# Patient Record
Sex: MALE | Race: WHITE | NOT HISPANIC OR LATINO | Employment: OTHER | URBAN - METROPOLITAN AREA
[De-identification: names, ages, dates, MRNs, and addresses within clinical notes are randomized per-mention and may not be internally consistent; named-entity substitution may affect disease eponyms.]

---

## 2018-03-14 ENCOUNTER — HOSPITAL ENCOUNTER (OUTPATIENT)
Dept: OTHER | Facility: HOSPITAL | Age: 68
Setting detail: SPECIMEN
Discharge: HOME | End: 2018-03-14
Attending: PSYCHIATRY & NEUROLOGY | Admitting: PSYCHIATRY & NEUROLOGY

## 2018-03-14 LAB
ALBUMIN SERPL-MCNC: 4.3 G/DL (ref 3.5–4.8)
ALBUMIN/GLOB SERPL: 1.5 {RATIO} (ref 1–1.7)
ALP SERPL-CCNC: 60 IU/L (ref 32–91)
ALT SERPL-CCNC: 51 IU/L (ref 17–63)
ANION GAP SERPL CALC-SCNC: 7.3 MMOL/L (ref 10–20)
AST SERPL-CCNC: 37 IU/L (ref 15–41)
BASOPHILS # BLD AUTO: 0 10*3/UL (ref 0–0.2)
BASOPHILS NFR BLD AUTO: 0 % (ref 0–2)
BILIRUB SERPL-MCNC: 0.8 MG/DL (ref 0.3–1.2)
BUN SERPL-MCNC: 8 MG/DL (ref 8–20)
BUN/CREAT SERPL: 6.7 (ref 6.2–20.3)
CALCIUM SERPL-MCNC: 9.9 MG/DL (ref 8.9–10.3)
CHLORIDE SERPL-SCNC: 104 MMOL/L (ref 101–111)
CONV CO2: 30 MMOL/L (ref 22–32)
CONV TOTAL PROTEIN: 7.1 G/DL (ref 6.1–7.9)
CREAT UR-MCNC: 1.2 MG/DL (ref 0.7–1.2)
DIFFERENTIAL METHOD BLD: (no result)
EOSINOPHIL # BLD AUTO: 0.1 10*3/UL (ref 0–0.3)
EOSINOPHIL # BLD AUTO: 2 % (ref 0–3)
ERYTHROCYTE [DISTWIDTH] IN BLOOD BY AUTOMATED COUNT: 13.1 % (ref 11.5–14.5)
ERYTHROCYTE [SEDIMENTATION RATE] IN BLOOD BY WESTERGREN METHOD: 10 MM/HR (ref 0–20)
GLOBULIN UR ELPH-MCNC: 2.8 G/DL (ref 2.5–3.8)
GLUCOSE SERPL-MCNC: 72 MG/DL (ref 65–99)
HCT VFR BLD AUTO: 48.5 % (ref 40–54)
HGB BLD-MCNC: 16.7 G/DL (ref 14–18)
LYMPHOCYTES # BLD AUTO: 2 10*3/UL (ref 0.8–4.8)
LYMPHOCYTES NFR BLD AUTO: 41 % (ref 18–42)
MCH RBC QN AUTO: 31.4 PG (ref 26–32)
MCHC RBC AUTO-ENTMCNC: 34.5 G/DL (ref 32–36)
MCV RBC AUTO: 91.1 FL (ref 80–94)
MONOCYTES # BLD AUTO: 0.6 10*3/UL (ref 0.1–1.3)
MONOCYTES NFR BLD AUTO: 13 % (ref 2–11)
NEUTROPHILS # BLD AUTO: 2.1 10*3/UL (ref 2.3–8.6)
NEUTROPHILS NFR BLD AUTO: 44 % (ref 50–75)
NRBC BLD AUTO-RTO: 0 /100{WBCS}
NRBC/RBC NFR BLD MANUAL: 0 10*3/UL
PLATELET # BLD AUTO: 248 10*3/UL (ref 150–450)
PMV BLD AUTO: 9 FL (ref 7.4–10.4)
POTASSIUM SERPL-SCNC: 4.3 MMOL/L (ref 3.6–5.1)
RBC # BLD AUTO: 5.32 10*6/UL (ref 4.6–6)
SODIUM SERPL-SCNC: 137 MMOL/L (ref 136–144)
WBC # BLD AUTO: 4.9 10*3/UL (ref 4.5–11.5)

## 2018-03-16 LAB
ANA SER QL IA: NORMAL
CHROMATIN AB SERPL-ACNC: <20 [IU]/ML (ref 0–20)

## 2018-03-19 ENCOUNTER — HOSPITAL ENCOUNTER (OUTPATIENT)
Dept: OTHER | Facility: HOSPITAL | Age: 68
Setting detail: SPECIMEN
Discharge: HOME | End: 2018-03-19
Attending: PSYCHIATRY & NEUROLOGY | Admitting: PSYCHIATRY & NEUROLOGY

## 2018-03-19 LAB
Lab: 24
PROT 24H UR-MRATE: 98 MG/(24.H) (ref 0–150)
PROT UR-MCNC: 4 MG/DL
URINE VOLUME: 2450 ML

## 2018-03-20 LAB
ARSENIC 24H UR-MRATE: NOT DETECTED MCG/SPEC (ref 0–35)
CADMIUM 24H UR-MRATE: 1.1 MCG/SPEC (ref 0–1.3)
COLLECT DURATION TIME UR: 24 H
LEAD 24H UR-MRATE: NOT DETECTED MCG/SPEC (ref 0–4)
MERCURY 24H UR-MRATE: NOT DETECTED MCG/SPEC (ref 0–9)
URINE VOLUME: 2450 ML

## 2018-03-21 LAB
INSULIN SERPL-ACNC: NORMAL U[IU]/ML
URINE PROT ELECTRO: NORMAL

## 2018-03-22 LAB — INTERPRETATION UR IFE-IMP: NORMAL

## 2019-12-10 ENCOUNTER — OFFICE VISIT (OUTPATIENT)
Dept: NEUROLOGY | Facility: CLINIC | Age: 69
End: 2019-12-10

## 2019-12-10 VITALS
HEIGHT: 71 IN | HEART RATE: 71 BPM | BODY MASS INDEX: 26.91 KG/M2 | WEIGHT: 192.2 LBS | SYSTOLIC BLOOD PRESSURE: 116 MMHG | DIASTOLIC BLOOD PRESSURE: 73 MMHG

## 2019-12-10 DIAGNOSIS — R20.2 NUMBNESS AND TINGLING SENSATION OF SKIN: ICD-10-CM

## 2019-12-10 DIAGNOSIS — R20.0 NUMBNESS AND TINGLING SENSATION OF SKIN: ICD-10-CM

## 2019-12-10 DIAGNOSIS — G62.89 PERIPHERAL AXONAL NEUROPATHY: Primary | ICD-10-CM

## 2019-12-10 PROBLEM — I10 HYPERTENSION: Status: ACTIVE | Noted: 2018-03-14

## 2019-12-10 PROCEDURE — 99214 OFFICE O/P EST MOD 30 MIN: CPT | Performed by: PSYCHIATRY & NEUROLOGY

## 2019-12-10 RX ORDER — GABAPENTIN 600 MG/1
TABLET ORAL
Qty: 360 TABLET | Refills: 3 | Status: SHIPPED | OUTPATIENT
Start: 2019-12-10 | End: 2020-12-10 | Stop reason: SDUPTHER

## 2019-12-10 RX ORDER — LISINOPRIL 20 MG/1
TABLET ORAL
COMMUNITY
Start: 2019-10-22

## 2019-12-10 RX ORDER — GABAPENTIN 600 MG/1
TABLET ORAL
COMMUNITY
Start: 2019-10-27 | End: 2019-12-10

## 2020-01-08 ENCOUNTER — TELEPHONE (OUTPATIENT)
Dept: NEUROLOGY | Facility: CLINIC | Age: 70
End: 2020-01-08

## 2020-01-08 NOTE — TELEPHONE ENCOUNTER
Patient was seen in December and you mentioned a anti depressant that would help him with the pain. Patient is seeing his PCP today and would like to discuss that with him I looked in his chart and didn't see any recommendation maybe you can recall what you told him. Please advise.

## 2020-12-10 ENCOUNTER — OFFICE VISIT (OUTPATIENT)
Dept: NEUROLOGY | Facility: CLINIC | Age: 70
End: 2020-12-10

## 2020-12-10 VITALS
HEIGHT: 70 IN | DIASTOLIC BLOOD PRESSURE: 80 MMHG | SYSTOLIC BLOOD PRESSURE: 129 MMHG | HEART RATE: 65 BPM | WEIGHT: 196.4 LBS | BODY MASS INDEX: 28.12 KG/M2 | TEMPERATURE: 97.1 F

## 2020-12-10 DIAGNOSIS — G62.89 PERIPHERAL AXONAL NEUROPATHY: Primary | ICD-10-CM

## 2020-12-10 PROBLEM — D69.6 THROMBOCYTOPENIA (HCC): Status: ACTIVE | Noted: 2019-09-09

## 2020-12-10 PROCEDURE — 99212 OFFICE O/P EST SF 10 MIN: CPT | Performed by: PSYCHIATRY & NEUROLOGY

## 2020-12-10 RX ORDER — GABAPENTIN 600 MG/1
TABLET ORAL
Qty: 450 TABLET | Refills: 3 | Status: SHIPPED | OUTPATIENT
Start: 2020-12-10 | End: 2020-12-23 | Stop reason: SDUPTHER

## 2020-12-10 RX ORDER — INFLUENZA A VIRUS A/MICHIGAN/45/2015 X-275 (H1N1) ANTIGEN (FORMALDEHYDE INACTIVATED), INFLUENZA A VIRUS A/SINGAPORE/INFIMH-16-0019/2016 IVR-186 (H3N2) ANTIGEN (FORMALDEHYDE INACTIVATED), INFLUENZA B VIRUS B/PHUKET/3073/2013 ANTIGEN (FORMALDEHYDE INACTIVATED), AND INFLUENZA B VIRUS B/MARYLAND/15/2016 BX-69A ANTIGEN (FORMALDEHYDE INACTIVATED) 60; 60; 60; 60 UG/.7ML; UG/.7ML; UG/.7ML; UG/.7ML
INJECTION, SUSPENSION INTRAMUSCULAR
COMMUNITY
Start: 2020-09-22 | End: 2021-01-01

## 2020-12-10 NOTE — PROGRESS NOTES
Subjective: Peripheral axonal neuropathy, Numbness and tingling sensation of skin    Patient ID: Sher Hinds is a 70 y.o. male.    History of Present Illness, yearly f/u    Peripheral axonal neuropathy, treated with Gabapentin     Numbness and tingling sensation of skin, stable     Patient states about the same has lost the feeling due to his C-6 issues has a hard time doing detailed work.     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 Was hospitalized.      Patient states outside allot and its nothing for him to go in the house from having frequent falls.     Essential tremor, no medication needed at this time.     The following portions of the patient's history were reviewed and updated as appropriate: allergies, current medications, past family history, past medical history, past social history, past surgical history and problem list.    Family History   Problem Relation Age of Onset   • Stroke Mother    • Dementia Mother    • Melanoma Father        Past Medical History:   Diagnosis Date   • Back pain    • Depression    • Diverticulitis    • H/O multiple concussions    • Hyperlipidemia    • Hypertension    • Peripheral neuropathy        Social History     Socioeconomic History   • Marital status:      Spouse name: Not on file   • Number of children: Not on file   • Years of education: Not on file   • Highest education level: Not on file   Tobacco Use   • Smoking status: Former Smoker   • Smokeless tobacco: Never Used   Substance and Sexual Activity   • Alcohol use: Yes     Comment: socially    • Drug use: Never   • Sexual activity: Defer         Current Outpatient Medications:   •  Acetaminophen (TYLENOL) 325 MG capsule, TYLENOL 325 MG CAPS, Disp: , Rfl:   •  Fluzone High-Dose Quadrivalent 0.7 ML suspension prefilled syringe, , Disp: , Rfl:   •  gabapentin (NEURONTIN) 600 MG tablet, One po qid, Disp: 360 tablet, Rfl: 3  •  lisinopril  (PRINIVIL,ZESTRIL) 20 MG tablet, , Disp: , Rfl:     Review of Systems   Constitutional: Negative for appetite change and fatigue.   HENT: Negative for sinus pressure and sinus pain.    Eyes: Negative for pain and itching.   Respiratory: Negative for cough and shortness of breath.    Cardiovascular: Negative for chest pain and palpitations.   Gastrointestinal: Negative for constipation and diarrhea.   Endocrine: Negative for cold intolerance and heat intolerance.   Genitourinary: Negative for difficulty urinating and frequency.   Musculoskeletal: Positive for gait problem. Negative for neck pain.   Allergic/Immunologic: Negative for environmental allergies.   Neurological: Positive for weakness and numbness. Negative for dizziness, tremors, seizures, syncope, facial asymmetry, speech difficulty, light-headedness and headaches.   Psychiatric/Behavioral: Positive for agitation. Negative for confusion.          I have reviewed ROS completed by medical assistant.     Objective:    Neurologic Exam     Mental Status   Oriented to person, place, and time.   Attention: normal.   Level of consciousness: alert    Gait, Coordination, and Reflexes     Reflexes   Right patellar: 1+  Left patellar: 1+  Right achilles: 0  Left achilles: 0      Physical Exam  Vitals signs reviewed.   Neurological:      Mental Status: He is oriented to person, place, and time.      Deep Tendon Reflexes:      Reflex Scores:       Patellar reflexes are 1+ on the right side and 1+ on the left side.       Achilles reflexes are 0 on the right side and 0 on the left side.  Psychiatric:         Mood and Affect: Mood normal.         Behavior: Behavior normal.         Thought Content: Thought content normal.         Assessment/Plan:    Diagnoses and all orders for this visit:    1. Peripheral axonal neuropathy (Primary)      Continue gabapentin. 4 to 5 600 mg per day      This document has been electronically signed by Joseph Seipel, MD on December 10, 2020  09:29 EST

## 2020-12-23 ENCOUNTER — TELEPHONE (OUTPATIENT)
Dept: NEUROLOGY | Facility: CLINIC | Age: 70
End: 2020-12-23

## 2020-12-23 NOTE — TELEPHONE ENCOUNTER
Pharmacy does have the rx cannot fill until the 30th can you re write the rx for 5 time a day last not says 4-5 qd and the rx is for 4 qd he is probably running out early thank you

## 2020-12-23 NOTE — TELEPHONE ENCOUNTER
PT CALLED IN TODAY TO SAY THAT [facility] STILL HASN'T RECEIVED HIS RX FOR GABAPENTIN THAT WAS SENT IN BY DR. SEIPEL ON 12/10/20. I NOTIFIED HIM THAT IT HAD BEEN SENT AND HE STATED UNDERSTANDING AND WILL CONTACT [facility]. I ADVISED HIM THAT I WOULD SEND OVER A MESSAGE LETTING THE PROVIDER KNOW THAT THE PHARMACY IS STATING IT HASN'T BEEN RECEIVED.        CALL BACK- [phone]

## 2020-12-24 RX ORDER — GABAPENTIN 600 MG/1
TABLET ORAL
Qty: 450 TABLET | Refills: 3 | Status: SHIPPED | OUTPATIENT
Start: 2020-12-24 | End: 2022-01-01

## 2021-01-01 ENCOUNTER — OFFICE VISIT (OUTPATIENT)
Dept: NEUROLOGY | Facility: CLINIC | Age: 71
End: 2021-01-01

## 2021-01-01 VITALS
SYSTOLIC BLOOD PRESSURE: 120 MMHG | TEMPERATURE: 97.7 F | WEIGHT: 196 LBS | DIASTOLIC BLOOD PRESSURE: 68 MMHG | HEART RATE: 70 BPM | HEIGHT: 70 IN | BODY MASS INDEX: 28.06 KG/M2

## 2021-01-01 DIAGNOSIS — R20.0 NUMBNESS AND TINGLING SENSATION OF SKIN: ICD-10-CM

## 2021-01-01 DIAGNOSIS — G25.0 BENIGN ESSENTIAL TREMOR: ICD-10-CM

## 2021-01-01 DIAGNOSIS — R20.2 NUMBNESS AND TINGLING SENSATION OF SKIN: ICD-10-CM

## 2021-01-01 DIAGNOSIS — G62.89 PERIPHERAL AXONAL NEUROPATHY: Primary | ICD-10-CM

## 2021-01-01 PROCEDURE — 99214 OFFICE O/P EST MOD 30 MIN: CPT | Performed by: PSYCHIATRY & NEUROLOGY

## 2021-01-01 RX ORDER — ESCITALOPRAM OXALATE 10 MG/1
TABLET ORAL
COMMUNITY
Start: 2021-01-01

## 2021-01-01 RX ORDER — PRIMIDONE 50 MG/1
TABLET ORAL
Qty: 120 TABLET | Refills: 2 | Status: SHIPPED | OUTPATIENT
Start: 2021-01-01

## 2021-03-10 ENCOUNTER — TELEPHONE (OUTPATIENT)
Dept: NEUROLOGY | Facility: CLINIC | Age: 71
End: 2021-03-10

## 2021-03-10 NOTE — TELEPHONE ENCOUNTER
I have no recommendation about shoes he would have to get this from his primary care physician or his podiatrist

## 2021-03-10 NOTE — TELEPHONE ENCOUNTER
Provider: DR SEIPEL    Caller: DONAL CASTRO    Relationship to Patient: SELF    Phone Number: [phone]    Reason for Call: THE PT CALLED IN TODAY BECAUSE HE STATED HE HAS NON-DIABETIC NEUROPATHY AND HE WAS LOOKING INTO GETTING DIABETIC NEUROPATHY SHOES AND HE IS WANTING TO GET DR SEIPEL'S RECOMMENDATION ON WHAT TYPE OF SHOE HE SHOULD GET. PLEASE GIVE HIM A CALL BACK WHEN POSSIBLE TO DISCUSS THIS.

## 2021-05-11 ENCOUNTER — ON CAMPUS - OUTPATIENT (OUTPATIENT)
Dept: URBAN - METROPOLITAN AREA HOSPITAL 77 | Facility: HOSPITAL | Age: 71
End: 2021-05-11
Payer: MEDICARE

## 2021-05-11 DIAGNOSIS — Z86.010 PERSONAL HISTORY OF COLONIC POLYPS: ICD-10-CM

## 2021-05-11 DIAGNOSIS — D12.5 BENIGN NEOPLASM OF SIGMOID COLON: ICD-10-CM

## 2021-05-11 DIAGNOSIS — K57.30 DIVERTICULOSIS OF LARGE INTESTINE WITHOUT PERFORATION OR ABS: ICD-10-CM

## 2021-05-11 DIAGNOSIS — K55.21 ANGIODYSPLASIA OF COLON WITH HEMORRHAGE: ICD-10-CM

## 2021-05-11 PROCEDURE — 45385 COLONOSCOPY W/LESION REMOVAL: CPT | Mod: 59,PT | Performed by: INTERNAL MEDICINE

## 2021-05-11 PROCEDURE — 45388 COLONOSCOPY W/ABLATION: CPT | Mod: PT | Performed by: INTERNAL MEDICINE

## 2021-05-11 PROCEDURE — 45385 COLONOSCOPY W/LESION REMOVAL: CPT | Mod: PT,59 | Performed by: INTERNAL MEDICINE

## 2021-12-09 NOTE — PROGRESS NOTES
Chief Complaint  Tremors, Peripheral Neuropathy, and Balance Issues    Subjective          Sher Hinds presents to [facility] for tremors,balance and neuropathy  History of Present Illness  Tremors- bilateral hand, rt hand worst than left.  Reports the tremor is most bothersome when he is trying to do close work.  He does note the tremor by eating and other activities.  He has no resting tremor.    Neuropathy- bilateral foot, patient states is worst since last visit he states he feels like feet are on fire w/ tingling/ stabbing pain. Patient and spouse states he couldn't wear shoes Friday he get to the point he cant stand for anything to touch area of neuropathy he states Gabapentin make problem manageable.    Balance- patient states his balance is better last ov he was having a bad spell of vertigo he went to PT to help          ====previous ov 12/10/20 dr seipel=====  yearly f/u     Peripheral axonal neuropathy, treated with Gabapentin     Numbness and tingling sensation of skin, stable      Patient states about the same has lost the feeling due to his C-6 issues has a hard time doing detailed work.      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 Was hospitalized.       Patient states outside allot and its nothing for him to go in the house from having frequent falls.      Essential tremor, no medication needed at this time    ============Dr Aguilar, 2018==================  History of Present Illness:          This is a left-handed patient who presents with follow-up visit.  The patient complains of Neuro`General Visit.  Problems reviewed and addressed today include: after testing.  Since the last visit patient notes no recent ER visit.  The patient is   having lab work done.  The pt states he continues to experience pain in his BLE/feet which interferes with sleep.  This pain has been present since 2006, but is  "not associated with any increased stumbling or falling.  The pt gets agitated when the pain is   more severe.  The pt denies any new motor weakness, speech or visual disturbances.     EMG LUE/LLE (3/22/2018) reveals a sensorimotor axonal peripheral neuropathy.     Lab results (3/14/2018) reveals unremarkable CBC, CMP, T4, TSH, ESR, ASAF, RA, B12.  (3/19/2018) reveals normal 24 hr urine heavy metal screen and UPE.  ====================================================    Current Outpatient Medications:   •  Acetaminophen (TYLENOL) 325 MG capsule, TYLENOL 325 MG CAPS, Disp: , Rfl:   •  escitalopram (LEXAPRO) 10 MG tablet, , Disp: , Rfl:   •  gabapentin (NEURONTIN) 600 MG tablet, One po 5 times per day, Disp: 450 tablet, Rfl: 3  •  lisinopril (PRINIVIL,ZESTRIL) 20 MG tablet, , Disp: , Rfl:   •  primidone (MYSOLINE) 50 MG tablet, One at hs May increase by one tab every two weeks to max of 5 tabs at hs, Disp: 120 tablet, Rfl: 2    Review of Systems   Constitutional: Positive for activity change and appetite change.   HENT: Positive for tinnitus.    Endocrine: Positive for heat intolerance. Negative for cold intolerance.   Musculoskeletal: Negative for joint swelling.   Neurological: Positive for tremors and numbness.   Psychiatric/Behavioral: Negative for agitation and confusion.          Objective:    Vital Signs:   /68   Pulse 70   Temp 97.7 °F (36.5 °C) (Temporal)   Ht 177.8 cm (70\")   Wt 88.9 kg (196 lb)   BMI 28.12 kg/m²     Physical Exam  Vitals reviewed.   Constitutional:       Appearance: Normal appearance.   Neurological:      Mental Status: He is alert and oriented to person, place, and time.      Comments: He has a mild to moderate action tremor bilaterally.  Muscle tone is normal and there is no resting tremor.        Result Review :                Neurologic Exam     Mental Status   Oriented to person, place, and time.         Assessment and Plan    Diagnoses and all orders for this visit:    1. " Peripheral axonal neuropathy (Primary)    2. Numbness and tingling sensation of skin    3. Benign essential tremor    Other orders  -     primidone (MYSOLINE) 50 MG tablet; One at hs May increase by one tab every two weeks to max of 5 tabs at hs  Dispense: 120 tablet; Refill: 2    This patient has an idiopathic axonal painful neuropathy which is gradually worsened.  He has had an extensive work-up which was unremarkable.  The gabapentin 2700 mg/day has worked better than other medications and continues to be beneficial.    He does have an action tremor consistent with an essential tremor.  For this will prescribe Mysoline starting 50 mg at bedtime which she can gradually increase every week or 2 x 1 tab to a maximum of 250 mg at bedtime.                 Follow Up   Return in about 1 year (around 12/13/2022).  Patient was given instructions and counseling regarding his condition or for health maintenance advice. Please see specific information pulled into the AVS if appropriate.     This document has been electronically signed by Joseph Seipel, MD on December 13, 2021 17:54 EST

## 2022-01-01 RX ORDER — GABAPENTIN 600 MG/1
TABLET ORAL
Qty: 450 TABLET | Refills: 3 | Status: SHIPPED | OUTPATIENT
Start: 2022-01-01

## 2022-07-20 ENCOUNTER — TELEPHONE (OUTPATIENT)
Dept: NEUROLOGY | Facility: OTHER | Age: 72
End: 2022-07-20

## 2022-07-20 NOTE — TELEPHONE ENCOUNTER
“Please be informed that patient has passed. Patient has been marked  in the system. The date of death is: 22    Caller: SNOW CASTRO    Relationship: WIFE    Best call back number: [phone]

## 2023-10-04 NOTE — PROGRESS NOTES
Added [facility] to treatment team for Physical Medicine Rehab consult  Electronically signed by Rona Garcia on 10/4/2023 at 7:46 AM Subjective:     Patient ID: Sher Hinds is a 69 y.o. male.    Chief complaint:  Peripheral axonal neuropathy     Yearly f/u for neuropathy, currently taking gabapentin doing well with medication.     There's some side effects that he doesn't like and some forgetfulness and gets agitated more often. Patient had jesus manuel mountain spotted fever in 8/19 and has exaberated everything.     Pt has tried lyrica, Cymbalta and amitriptyline. Did not like all these medications.     Pt complains of action tremor.  New problem.    The following portions of the patient's history were reviewed and updated as appropriate: allergies, current medications, past family history, past medical history, past social history, past surgical history and problem list.    Family History   Problem Relation Age of Onset   • Stroke Mother    • Dementia Mother    • Melanoma Father        Past Medical History:   Diagnosis Date   • Back pain    • Depression    • Diverticulitis    • H/O multiple concussions    • Hyperlipidemia    • Hypertension    • Peripheral neuropathy        Social History     Socioeconomic History   • Marital status:      Spouse name: Not on file   • Number of children: Not on file   • Years of education: Not on file   • Highest education level: Not on file   Tobacco Use   • Smoking status: Former Smoker   • Smokeless tobacco: Never Used   Substance and Sexual Activity   • Alcohol use: Yes     Comment: socially    • Drug use: Never   • Sexual activity: Defer         Current Outpatient Medications:   •  Acetaminophen (TYLENOL) 325 MG capsule, TYLENOL 325 MG CAPS, Disp: , Rfl:   •  gabapentin (NEURONTIN) 600 MG tablet, , Disp: , Rfl:   •  lisinopril (PRINIVIL,ZESTRIL) 20 MG tablet, , Disp: , Rfl:     Review of Systems   Constitutional: Negative for appetite change and fatigue.   HENT: Negative for sinus pressure and sinus pain.    Eyes: Negative for pain and itching.   Respiratory: Negative for cough and shortness of breath.     Cardiovascular: Negative for chest pain and palpitations.   Gastrointestinal: Negative for constipation and diarrhea.   Endocrine: Negative for cold intolerance and heat intolerance.   Genitourinary: Negative for difficulty urinating and frequency.   Musculoskeletal: Negative for gait problem and neck pain.   Allergic/Immunologic: Negative for environmental allergies.   Neurological: Positive for weakness and numbness. Negative for dizziness, tremors, seizures, syncope, facial asymmetry, speech difficulty, light-headedness and headaches.   Psychiatric/Behavioral: Positive for agitation. Negative for confusion.          I have reviewed ROS completed by medical assistant.     Objective:    Physical Exam   Constitutional: He appears well-developed and well-nourished.   HENT:   Mouth/Throat: Oropharynx is clear and moist.   Musculoskeletal: Normal range of motion. He exhibits no edema or deformity.   Psychiatric: He has a normal mood and affect. His behavior is normal.   Vitals reviewed.      Assessment/Plan:    Sher was seen today for peripheral neuropathy.    Diagnoses and all orders for this visit:    Peripheral axonal neuropathy    Numbness and tingling sensation of skin      Continue gabapentin for the pain associated with the neuropathy,     Essential tremor, no medication needed at this time      This document has been electronically signed by Joseph Seipel, MD on December 10, 2019 11:49 AM